# Patient Record
Sex: MALE | Race: WHITE
[De-identification: names, ages, dates, MRNs, and addresses within clinical notes are randomized per-mention and may not be internally consistent; named-entity substitution may affect disease eponyms.]

---

## 2017-03-25 ENCOUNTER — HOSPITAL ENCOUNTER (EMERGENCY)
Dept: HOSPITAL 11 - JP.ED | Age: 67
Discharge: NURSING, REHAB OR LONG-TERM CARE | End: 2017-03-25
Payer: MEDICARE

## 2017-03-25 VITALS — SYSTOLIC BLOOD PRESSURE: 167 MMHG | DIASTOLIC BLOOD PRESSURE: 90 MMHG

## 2017-03-25 DIAGNOSIS — F10.10: ICD-10-CM

## 2017-03-25 DIAGNOSIS — I10: ICD-10-CM

## 2017-03-25 DIAGNOSIS — R73.9: ICD-10-CM

## 2017-03-25 DIAGNOSIS — Z88.8: ICD-10-CM

## 2017-03-25 DIAGNOSIS — R06.02: ICD-10-CM

## 2017-03-25 DIAGNOSIS — R41.0: Primary | ICD-10-CM

## 2017-03-25 DIAGNOSIS — Z79.82: ICD-10-CM

## 2017-03-25 DIAGNOSIS — R74.8: ICD-10-CM

## 2017-03-25 DIAGNOSIS — E87.6: ICD-10-CM

## 2017-03-25 DIAGNOSIS — Z79.899: ICD-10-CM

## 2017-03-25 PROCEDURE — 70450 CT HEAD/BRAIN W/O DYE: CPT

## 2017-03-25 PROCEDURE — 96361 HYDRATE IV INFUSION ADD-ON: CPT

## 2017-03-25 PROCEDURE — 99285 EMERGENCY DEPT VISIT HI MDM: CPT

## 2017-03-25 PROCEDURE — 82550 ASSAY OF CK (CPK): CPT

## 2017-03-25 PROCEDURE — 80053 COMPREHEN METABOLIC PANEL: CPT

## 2017-03-25 PROCEDURE — 82140 ASSAY OF AMMONIA: CPT

## 2017-03-25 PROCEDURE — 96372 THER/PROPH/DIAG INJ SC/IM: CPT

## 2017-03-25 PROCEDURE — 83690 ASSAY OF LIPASE: CPT

## 2017-03-25 PROCEDURE — 84484 ASSAY OF TROPONIN QUANT: CPT

## 2017-03-25 PROCEDURE — 81001 URINALYSIS AUTO W/SCOPE: CPT

## 2017-03-25 PROCEDURE — 71010: CPT

## 2017-03-25 PROCEDURE — 93010 ELECTROCARDIOGRAM REPORT: CPT

## 2017-03-25 PROCEDURE — 93005 ELECTROCARDIOGRAM TRACING: CPT

## 2017-03-25 PROCEDURE — 83880 ASSAY OF NATRIURETIC PEPTIDE: CPT

## 2017-03-25 PROCEDURE — 36415 COLL VENOUS BLD VENIPUNCTURE: CPT

## 2017-03-25 PROCEDURE — 96375 TX/PRO/DX INJ NEW DRUG ADDON: CPT

## 2017-03-25 PROCEDURE — 85025 COMPLETE CBC W/AUTO DIFF WBC: CPT

## 2017-03-25 PROCEDURE — G0480 DRUG TEST DEF 1-7 CLASSES: HCPCS

## 2017-03-25 PROCEDURE — 96374 THER/PROPH/DIAG INJ IV PUSH: CPT

## 2017-03-25 NOTE — EDM.PDOC
ED HPI NEURO





- General


Chief Complaint: Neuro Symptoms/Deficits


Stated Complaint: MEDICAL VIA [facility]


Time Seen by Provider: 03/25/17 18:46


Source: Reports: Patient, Family


History Limitations: Reports: No limitations





- History of Present Illness


INITIAL COMMENTS - FREE TEXT/NARRATIVE: 





pt arrived after a period of being quite confused. He has been ill for the past 

2 days and has neen doing alot of vomiting. He does not have chest pain 


Timing/Duration: Reports: Day(s):, Getting worse, Other (pt has been vomiting 

for several days. He normally drinks etoh on a regular basis . He hs not drank 

for the past 2 days. He may be having some withdrawal at this point. )


Location (Neuro Complaint): Reports: generalized


Quality (Neuro Complaint): Reports: other (Pt is has generalized weakness and 

confusion. )


Associated symptoms: Reports: confusion, weakness, nausea/vomiting





- Related Data


Allergies/ADRs: 


 Allergies











Allergy/AdvReac Type Severity Reaction Status Date / Time


 


amlodipine Allergy  Swelling Verified 03/25/17 19:00


 


nifedipine Allergy  Rash Verified 03/25/17 19:00











Home Meds: 


 Home Meds





Losartan/Hydrochlorothiazide [Hyzaar 100-12.5 Tablet] 2 each PO DAILY 10/04/13 [

History]


Metoprolol Tartrate [Lopressor] 100 mg PO BID 10/04/13 [History]


PARoxetine HCl [Paxil] 40 mg PO DAILY 10/04/13 [History]


Simvastatin [Zocor] 40 mg PO BEDTIME 10/04/13 [History]


glyBURIDE [Diabeta] 2.5 mg PO DAILY 10/04/13 [History]


hydrALAZINE HCl [Hydralazine HCl] 25 mg PO BID 10/04/13 [History]


metFORMIN [Glucophage] 1,000 mg PO BIDM 10/04/13 [History]


Aspirin [Halfprin] 81 mg PO DAILY 03/25/17 [History]











Social & Family History





- Tobacco Use


Years of Tobacco use: 0





- Alcohol Use


Days Per Week of Alcohol Use: 1


Number of Drinks Per Day: 5


Total Drinks Per Week: 5





- Recreational Drug Use


Recreational Drug Use: No





- Living Situation & Occupation


Living situation: Reports: with spouse


Occupation: retired (Substitute teaching)





ED ROS GENERAL





- Review of Systems


Review Of Systems: See Below


Constitutional: Reports: no symptoms


HEENT: Reports: No symptoms


Respiratory: Reports: No Symptoms


Cardiovascular: Reports: Other (pt did have a low o2 sat when the first 

responders arrived. He has been maintaining at 92-93 %.  He has not had any 

chest pain but he has had alot of vomiting and nausea. He has not been able to 

take his meds. )


Endocrine: Reports: high glucose


GI/Abdominal: Reports: Nausea, Vomiting


: Reports: no symptoms


Musculoskeletal: Reports: no symptoms


Skin: Reports: no symptoms





ED EXAM, NEURO





- Physical Exam


Exam: See Below


Text/Narrative:: 





pt is a pale appearing pt who became very confused this afternoon.  He did not 

have chest pain. He has not drank for 2 days and does drink regularly. He 

denies abdomanal pain. 


Exam Limited By: Altered mental status


General Appearance: alert, obtunded, other (pupils are equal and reactive. )


Ears: normal TMs


Nose: normal inspection


Throat/Mouth: Normal inspection


Head Exam: atraumatic


Neck: normal inspection


Respiratory/Chest: no respiratory distress


Cardiovascular: regular rate, rhythm


GI/Abdominal: soft, non tender


 (Male) Exam: Deferred


Rectal (Males) Exam: Deferred


Neurological: alert, other (pt has generalized weakness. He is slow to answer. 

He is oriented to name and where he is. )


Back Exam: normal inspection


Extremities: normal inspection


Psychiatric: flat affect, other ( slow to respond. )





Course





- Vital Signs


Last Recorded V/S: 


 Last Vital Signs











Temp  37.8 C   03/25/17 20:20


 


Pulse  107 H  03/25/17 20:00


 


Resp  18   03/25/17 20:00


 


BP  167/90 H  03/25/17 20:00


 


Pulse Ox  94 L  03/25/17 20:00














- Orders/Labs/Meds


Orders: 


 Active Orders 24 hr











 Category Date Time Status


 


 EKG Documentation Completion [RC] ASDIRECTED Care  03/25/17 18:46 Active


 


 Chest 1V Frontal [CR] Stat Exams  03/25/17 19:41 Ordered


 


 Head wo Cont [CT] Stat Exams  03/25/17 18:45 Taken


 


 Potassium Chloride [KCL 20 MEQ in Water 100 ML] 20 meq Med  03/25/17 19:52 

Ordered





 Premix Bag 1 bag   





 IV ONETIME   


 


 Sodium Chloride 0.9% [Normal Saline] 1,000 ml Med  03/25/17 18:45 Active





 IV ASDIRECTED   


 


 EKG 12 Lead [EK] Routine Ther  03/25/17 18:46 Ordered








 Medication Orders





Sodium Chloride (Normal Saline)  1,000 mls @ 200 mls/hr IV ASDIRECTED DIMITRI


   Last Admin: 03/25/17 18:50  Dose: 200 mls/hr


Potassium Chloride 20 meq/ (Premix)  100 mls @ 50 mls/hr IV ONETIME ONE


   Stop: 03/25/17 21:51


   Last Admin: 03/25/17 20:05 Dose:  50 mls/hr








Labs: 


 Laboratory Tests











  03/25/17 03/25/17 03/25/17 Range/Units





  18:55 18:55 18:55 


 


WBC     (4.5-11.0)  K/uL


 


RBC     (4.30-5.90)  M/uL


 


Hgb     (12.0-15.0)  g/dL


 


Hct     (40.0-54.0)  %


 


MCV     (80-98)  fL


 


MCH     (27-31)  pg


 


MCHC     (32-36)  %


 


Plt Count     (150-400)  K/uL


 


Neut % (Auto)     (36-66)  %


 


Lymph % (Auto)     (24-44)  %


 


Mono % (Auto)     (2-6)  %


 


Eos % (Auto)     (2-4)  %


 


Baso % (Auto)     (0-1)  %


 


Sodium     (140-148)  mmol/L


 


Potassium     (3.6-5.2)  mmol/L


 


Chloride     (100-108)  mmol/L


 


Carbon Dioxide     (21-32)  mmol/L


 


Anion Gap     (5.0-14.0)  mmol/L


 


BUN     (7-18)  mg/dL


 


Creatinine     (0.8-1.3)  mg/dL


 


Est Cr Clr Drug Dosing     mL/min


 


Estimated GFR (MDRD)     (>60)  


 


Glucose     ()  mg/dL


 


Calcium     (8.5-10.1)  mg/dL


 


Total Bilirubin     (0.2-1.0)  mg/dL


 


AST     (15-37)  U/L


 


ALT     (12-78)  U/L


 


Alkaline Phosphatase     ()  U/L


 


Ammonia  12    (11-32)  mmol/L


 


Creatine Kinase     ()  U/L


 


Troponin I    0.441 H*  (0.000-0.056)  ng/mL


 


Pro-B-Natriuretic Pept     (5-125)  pg/mL


 


Total Protein     (6.4-8.2)  g/dL


 


Albumin     (3.4-5.0)  g/dL


 


Globulin     (2.3-3.5)  g/dL


 


Albumin/Globulin Ratio     (1.2-2.2)  


 


Lipase   245   ()  U/L


 


Urine Color     


 


Urine Appearance     


 


Urine pH     (4.5-8.0)  


 


Ur Specific Gravity     (1.008-1.030)  


 


Urine Protein     (NEGATIVE)  mg/dL


 


Urine Glucose (UA)     (NEGATIVE)  mg/dL


 


Urine Ketones     (NEGATIVE)  mg/dL


 


Urine Occult Blood     (NEGATIVE)  


 


Urine Nitrite     (NEGAITVE)  


 


Urine Bilirubin     (NEGATIVE)  


 


Urine Urobilinogen     (NORMAL)  mg/dL


 


Ur Leukocyte Esterase     (NEGATIVE)  


 


Urine RBC     (0-5)  


 


Urine WBC     (0-5)  


 


Ur Epithelial Cells     


 


Amorphous Sediment     


 


Urine Bacteria     


 


Urine Mucus     


 


Ethyl Alcohol     mg/dL














  03/25/17 03/25/17 03/25/17 Range/Units





  18:55 18:55 19:20 


 


WBC  12.4 H    (4.5-11.0)  K/uL


 


RBC  4.04 L    (4.30-5.90)  M/uL


 


Hgb  13.8    (12.0-15.0)  g/dL


 


Hct  39.5 L    (40.0-54.0)  %


 


MCV  98    (80-98)  fL


 


MCH  34 H    (27-31)  pg


 


MCHC  35    (32-36)  %


 


Plt Count  155    (150-400)  K/uL


 


Neut % (Auto)  89 H    (36-66)  %


 


Lymph % (Auto)  4 L    (24-44)  %


 


Mono % (Auto)  7 H    (2-6)  %


 


Eos % (Auto)  0 L    (2-4)  %


 


Baso % (Auto)  0    (0-1)  %


 


Sodium   137 L   (140-148)  mmol/L


 


Potassium   3.1 L   (3.6-5.2)  mmol/L


 


Chloride   94 L   (100-108)  mmol/L


 


Carbon Dioxide   26   (21-32)  mmol/L


 


Anion Gap   20.1 H   (5.0-14.0)  mmol/L


 


BUN   12   (7-18)  mg/dL


 


Creatinine   1.2   (0.8-1.3)  mg/dL


 


Est Cr Clr Drug Dosing   62.75   mL/min


 


Estimated GFR (MDRD)   > 60   (>60)  


 


Glucose   322 H   ()  mg/dL


 


Calcium   9.1   (8.5-10.1)  mg/dL


 


Total Bilirubin   0.9   (0.2-1.0)  mg/dL


 


AST   52 H   (15-37)  U/L


 


ALT   39   (12-78)  U/L


 


Alkaline Phosphatase   59   ()  U/L


 


Ammonia     (11-32)  mmol/L


 


Creatine Kinase   426 H   ()  U/L


 


Troponin I     (0.000-0.056)  ng/mL


 


Pro-B-Natriuretic Pept     (5-125)  pg/mL


 


Total Protein   7.8   (6.4-8.2)  g/dL


 


Albumin   4.2   (3.4-5.0)  g/dL


 


Globulin   3.6 H   (2.3-3.5)  g/dL


 


Albumin/Globulin Ratio   1.2   (1.2-2.2)  


 


Lipase     ()  U/L


 


Urine Color    Yellow  


 


Urine Appearance    Slightly cloudy  


 


Urine pH    7.0  (4.5-8.0)  


 


Ur Specific Gravity    1.015  (1.008-1.030)  


 


Urine Protein    Negative  (NEGATIVE)  mg/dL


 


Urine Glucose (UA)    1000 H  (NEGATIVE)  mg/dL


 


Urine Ketones    50 H  (NEGATIVE)  mg/dL


 


Urine Occult Blood    Large  (NEGATIVE)  


 


Urine Nitrite    Negative  (NEGAITVE)  


 


Urine Bilirubin    Negative  (NEGATIVE)  


 


Urine Urobilinogen    Normal  (NORMAL)  mg/dL


 


Ur Leukocyte Esterase    Negative  (NEGATIVE)  


 


Urine RBC    5-10 H  (0-5)  


 


Urine WBC    0-5  (0-5)  


 


Ur Epithelial Cells    Few  


 


Amorphous Sediment    Not seen  


 


Urine Bacteria    Many  


 


Urine Mucus    Rare  


 


Ethyl Alcohol     mg/dL














  03/25/17 03/25/17 Range/Units





  19:45 19:45 


 


WBC    (4.5-11.0)  K/uL


 


RBC    (4.30-5.90)  M/uL


 


Hgb    (12.0-15.0)  g/dL


 


Hct    (40.0-54.0)  %


 


MCV    (80-98)  fL


 


MCH    (27-31)  pg


 


MCHC    (32-36)  %


 


Plt Count    (150-400)  K/uL


 


Neut % (Auto)    (36-66)  %


 


Lymph % (Auto)    (24-44)  %


 


Mono % (Auto)    (2-6)  %


 


Eos % (Auto)    (2-4)  %


 


Baso % (Auto)    (0-1)  %


 


Sodium    (140-148)  mmol/L


 


Potassium    (3.6-5.2)  mmol/L


 


Chloride    (100-108)  mmol/L


 


Carbon Dioxide    (21-32)  mmol/L


 


Anion Gap    (5.0-14.0)  mmol/L


 


BUN    (7-18)  mg/dL


 


Creatinine    (0.8-1.3)  mg/dL


 


Est Cr Clr Drug Dosing    mL/min


 


Estimated GFR (MDRD)    (>60)  


 


Glucose    ()  mg/dL


 


Calcium    (8.5-10.1)  mg/dL


 


Total Bilirubin    (0.2-1.0)  mg/dL


 


AST    (15-37)  U/L


 


ALT    (12-78)  U/L


 


Alkaline Phosphatase    ()  U/L


 


Ammonia    (11-32)  mmol/L


 


Creatine Kinase    ()  U/L


 


Troponin I    (0.000-0.056)  ng/mL


 


Pro-B-Natriuretic Pept  2580 H   (5-125)  pg/mL


 


Total Protein    (6.4-8.2)  g/dL


 


Albumin    (3.4-5.0)  g/dL


 


Globulin    (2.3-3.5)  g/dL


 


Albumin/Globulin Ratio    (1.2-2.2)  


 


Lipase    ()  U/L


 


Urine Color    


 


Urine Appearance    


 


Urine pH    (4.5-8.0)  


 


Ur Specific Gravity    (1.008-1.030)  


 


Urine Protein    (NEGATIVE)  mg/dL


 


Urine Glucose (UA)    (NEGATIVE)  mg/dL


 


Urine Ketones    (NEGATIVE)  mg/dL


 


Urine Occult Blood    (NEGATIVE)  


 


Urine Nitrite    (NEGAITVE)  


 


Urine Bilirubin    (NEGATIVE)  


 


Urine Urobilinogen    (NORMAL)  mg/dL


 


Ur Leukocyte Esterase    (NEGATIVE)  


 


Urine RBC    (0-5)  


 


Urine WBC    (0-5)  


 


Ur Epithelial Cells    


 


Amorphous Sediment    


 


Urine Bacteria    


 


Urine Mucus    


 


Ethyl Alcohol   < 3  mg/dL











Meds: 


Medications











Generic Name Dose Route Start Last Admin





  Trade Name Freq  PRN Reason Stop Dose Admin


 


Sodium Chloride  1,000 mls @ 200 mls/hr  03/25/17 18:45  03/25/17 18:50





  Normal Saline  IV   200 mls/hr





  ASDIRECTED DIMITRI   Administration


 


Potassium Chloride 20 meq/  100 mls @ 50 mls/hr  03/25/17 19:52  03/25/17 20:05





  Premix  IV  03/25/17 21:51  50 mls/hr





  ONETIME ONE   Administration














Discontinued Medications














Generic Name Dose Route Start Last Admin





  Trade Name Pilar  PRN Reason Stop Dose Admin


 


Insulin Aspart  7 unit  03/25/17 19:54  03/25/17 20:15





  Novolog  SUBCUT  03/25/17 19:55  7 units





  ASDIRECTED ONE   Administration


 


Labetalol HCl  20 mg  03/25/17 19:53  03/25/17 19:59





  Normodyne  IVPUSH  03/25/17 19:54  20 mg





  NOW ONE   Administration


 


Lidocaine HCl  Confirm  03/25/17 19:58  03/25/17 20:15





  Xylocaine-Mpf 1%  Administered  03/25/17 19:59  Not Given





  Dose   





  5 ml   





  .ROUTE   





  .STK-MED ONE   


 


Lidocaine HCl  2 ml  03/25/17 20:08  03/25/17 20:10





  Xylocaine-Mpf 1%  INJECT  03/25/17 20:09  2 ml





  ONETIME ONE   Administration


 


Ondansetron HCl  4 mg  03/25/17 18:44  03/25/17 18:51





  Zofran  IVPUSH  03/25/17 18:45  4 mg





  ONETIME ONE   Administration














- Re-Assessments/Exams


Free Text/Narrative Re-Assessment/Exam: 





03/25/17 20:19


pt had a elevated trop at .44. He has a bs of 344. He has a k of 3.1. He has an 

elevated bp and was given labatiol 20mg iv. He ws given kcl. He was given7 

units of humolog . He was given kcl. He had asa in the ambulance.  


03/25/17 20:23


 cat scan of the head was normal. 





Departure





- Departure


Time of Disposition: 20:21


Disposition: DC/[facility] to [facility]


Condition: fair


Clinical Impression: 


 Elevated troponin, Confusion, ETOH abuse, Hypokalemia, Hypertension, 

Hyperglycemia





Referrals: 


PCP,None [Primary Care Provider] - 


Forms:  ED Department Discharge


Care Plan Goals: 


 transfer to [facility] by ambulance. 





- My Orders


Last 24 Hours: 


My Active Orders





03/25/17 18:45


Head wo Cont [CT] Stat 


Sodium Chloride 0.9% [Normal Saline] 1,000 ml IV ASDIRECTED 





03/25/17 18:46


EKG Documentation Completion [RC] ASDIRECTED 


EKG 12 Lead [EK] Routine 





03/25/17 19:41


Chest 1V Frontal [CR] Stat 





03/25/17 19:52


Potassium Chloride [KCL 20 MEQ in Water 100 ML] 20 meq   Premix Bag 1 bag IV 

ONETIME 














- Assessment/Plan


Last 24 Hours: 


My Active Orders





03/25/17 18:45


Head wo Cont [CT] Stat 


Sodium Chloride 0.9% [Normal Saline] 1,000 ml IV ASDIRECTED 





03/25/17 18:46


EKG Documentation Completion [RC] ASDIRECTED 


EKG 12 Lead [EK] Routine 





03/25/17 19:41


Chest 1V Frontal [CR] Stat 





03/25/17 19:52


Potassium Chloride [KCL 20 MEQ in Water 100 ML] 20 meq   Premix Bag 1 bag IV 

ONETIME

## 2017-03-27 NOTE — CR
Chest 1V Frontal

 

HISTORY: Shortness of breath.

 

Comparison: 10/25/2015.

 

FINDINGS: Cardiac size and pulmonary vessels are normal. The lungs are clear.

 

IMPRESSION: Negative AP chest.

## 2018-01-08 ENCOUNTER — HOSPITAL ENCOUNTER (EMERGENCY)
Dept: HOSPITAL 11 - JP.ED | Age: 68
LOS: 1 days | Discharge: NURSING, REHAB OR LONG-TERM CARE | End: 2018-01-09
Payer: MEDICARE

## 2018-01-08 VITALS — SYSTOLIC BLOOD PRESSURE: 164 MMHG | DIASTOLIC BLOOD PRESSURE: 92 MMHG

## 2018-01-08 DIAGNOSIS — Z79.84: ICD-10-CM

## 2018-01-08 DIAGNOSIS — F10.231: Primary | ICD-10-CM
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
## 2018-01-08 DIAGNOSIS — Z79.899: ICD-10-CM

## 2018-01-08 DIAGNOSIS — E78.00: ICD-10-CM

## 2018-01-08 DIAGNOSIS — E11.9: ICD-10-CM

## 2018-01-08 DIAGNOSIS — Z87.891: ICD-10-CM

## 2018-01-08 DIAGNOSIS — F32.9: ICD-10-CM

## 2018-01-08 DIAGNOSIS — S01.512A: ICD-10-CM
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
## 2018-01-08 DIAGNOSIS — I10: ICD-10-CM

## 2018-01-08 DIAGNOSIS — Z88.8: ICD-10-CM

## 2018-01-08 PROCEDURE — 99285 EMERGENCY DEPT VISIT HI MDM: CPT

## 2018-01-08 PROCEDURE — 81001 URINALYSIS AUTO W/SCOPE: CPT

## 2018-01-08 PROCEDURE — 85025 COMPLETE CBC W/AUTO DIFF WBC: CPT

## 2018-01-08 PROCEDURE — 80053 COMPREHEN METABOLIC PANEL: CPT

## 2018-01-08 PROCEDURE — 96376 TX/PRO/DX INJ SAME DRUG ADON: CPT

## 2018-01-08 PROCEDURE — 80305 DRUG TEST PRSMV DIR OPT OBS: CPT

## 2018-01-08 PROCEDURE — 96365 THER/PROPH/DIAG IV INF INIT: CPT

## 2018-01-08 PROCEDURE — 96366 THER/PROPH/DIAG IV INF ADDON: CPT

## 2018-01-08 PROCEDURE — G0480 DRUG TEST DEF 1-7 CLASSES: HCPCS

## 2018-01-08 PROCEDURE — 85610 PROTHROMBIN TIME: CPT

## 2018-01-08 PROCEDURE — 96375 TX/PRO/DX INJ NEW DRUG ADDON: CPT

## 2018-01-08 PROCEDURE — 70450 CT HEAD/BRAIN W/O DYE: CPT

## 2018-01-08 PROCEDURE — 36415 COLL VENOUS BLD VENIPUNCTURE: CPT

## 2018-01-08 NOTE — EDM.PDOC
ED HPI GENERAL MEDICAL PROBLEM





- General


Chief Complaint: Neuro Symptoms/Deficits


Stated Complaint: SEIZURE


Time Seen by Provider: 01/08/18 18:07


Source of Information: Reports: Patient, Family, RN Notes Reviewed


History Limitations: Reports: No Limitations





- History of Present Illness


INITIAL COMMENTS - FREE TEXT/NARRATIVE: 





67-year-old gentleman presents to the emergency department today with the 

complaint of seizure-like activity, he has a known history of alcohol abuse and 

dependence has had one seizure episode prior which included workup of CT scan. 

For this particular event he does admit to consuming vodka and wine last drink 

was on Friday or approximately 72 hours prior he did have tremors yesterday has 

had difficulty with vomiting throughout the weekend had an episode today 

described by his wife where he was stiff did bite his tongue eyes were closed 

and was having difficulty maintaining his secretions. At this time he denies 

any symptomology other than a sore tongue and is in denial about his alcohol 

use.





- Related Data


 Allergies











Allergy/AdvReac Type Severity Reaction Status Date / Time


 


amlodipine Allergy  Swelling Verified 01/08/18 17:56


 


nifedipine Allergy  Rash Verified 01/08/18 17:56











Home Meds: 


 Home Meds





Losartan/Hydrochlorothiazide [Hyzaar 100-12.5 Tablet] 2 each PO DAILY 10/04/13 [

History]


Metoprolol Tartrate [Lopressor] 100 mg PO BID 10/04/13 [History]


PARoxetine HCl [Paxil] 40 mg PO DAILY 10/04/13 [History]


glyBURIDE [Diabeta] 2.5 mg PO DAILY 10/04/13 [History]


hydrALAZINE HCl [Hydralazine HCl] 25 mg PO BID 10/04/13 [History]


metFORMIN [Glucophage] 1,000 mg PO BID 10/04/13 [History]











Past Medical History


HEENT History: Reports: Impaired Vision


Cardiovascular History: Reports: High Cholesterol, Hypertension


Genitourinary History: Reports: Prostate Disorder


Musculoskeletal History: Reports: Back Pain, Chronic


Psychiatric History: Reports: Addiction (Alcohol), Anxiety, Depression


Endocrine/Metabolic History: Reports: Diabetes, Type II





- Infectious Disease History


Infectious Disease History: Reports: Chicken Pox





- Past Surgical History


Neurological Surgical History: Reports: Other (See Below)





Social & Family History





- Tobacco Use


Smoking Status *Q: Unknown Ever Smoked


Years of Tobacco use: 0


Used Tobacco, but Quit: Yes


Month Tobacco Last Used: july 2016





- Caffeine Use


Caffeine Use: Reports: None





- Alcohol Use


Days Per Week of Alcohol Use: 7


Number of Drinks Per Day: 5


Total Drinks Per Week: 35





- Recreational Drug Use


Recreational Drug Use: No





- Living Situation & Occupation


Living situation: Reports: with Spouse


Occupation: Retired





ED ROS GENERAL





- Review of Systems


Review Of Systems: See Below


Constitutional: Reports: No Symptoms


HEENT: Reports: Other (Sore tongue)


Respiratory: Reports: No Symptoms


Cardiovascular: Reports: No Symptoms


Endocrine: Reports: No Symptoms


GI/Abdominal: Reports: No Symptoms


: Reports: No Symptoms


Musculoskeletal: Reports: No Symptoms


Skin: Reports: No Symptoms


Neurological: Reports: No Symptoms





ED EXAM, NEURO





- Physical Exam


Exam: See Below


Text/Narrative:: 





General: Male, not in any distress, alert and oriented x2 confused on year 1971 

HEENT: head is atraumatic normocephalic, eyes pupils equal round reactive to 

light, sclera clear no conjunctivitis appreciated.  Ears tympanic membranes 

clear and gray landmarks and light reflex are present bilaterally canals are 

clear.  Nose no septal deviation, nares are clear, no blood present.  Mouth 

mucosa is dry and pink no erythema or exudate noted in soft palate, tongue is 

midline uvula is midline, tongue does have a superficial lacerations consistent 

with a bite on the right lateral aspect, dentition is intact.


Neck: Supple no thyromegaly no tracheal deviation.


Nodes: Cervical nodes subclavicular nodes nontender no palpable lymphadenopathy 

noted.


Lungs: breath sounds are distant , no adventitious noise appreciated.


CV: Regular rate and rhythm S1 and S2 appreciated no murmurs rubs or gallops 

noted.


Abdomen: Soft, nontender, no palpable masses or organomegaly appreciated, no 

distention no guarding bowel sounds are present, .


Neuro: Cranial nerves II through XII grossly intact


Skin: Warm and dry, intact


Extremities: No lower extremity edema appreciated, pedal pulse is +2.





Course





- Vital Signs


Last Recorded V/S: 


 Last Vital Signs











Temp  99.7 F   01/08/18 21:17


 


Pulse  88   01/08/18 23:40


 


Resp  16   01/08/18 23:40


 


BP  164/92 H  01/08/18 23:40


 


Pulse Ox  92 L  01/08/18 23:40














- Orders/Labs/Meds


Orders: 


 Active Orders 24 hr











 Category Date Time Status


 


 Head wo Cont [CT] Stat Exams  01/08/18 22:27 Taken


 


 DRUG SCREEN, URINE [URCHEM] Stat Lab  01/08/18 22:27 Uncollected


 


 UA W/MICROSCOPIC [URIN] Urgent Lab  01/08/18 22:27 Uncollected


 


 LORazepam [Ativan] Med  01/09/18 00:03 Once





 2 mg IVPUSH ONETIME ONE   











Labs: 


 Laboratory Tests











  01/08/18 01/08/18 01/08/18 Range/Units





  18:32 18:32 18:32 


 


WBC  8.5    (4.5-11.0)  K/uL


 


RBC  4.33    (4.30-5.90)  M/uL


 


Hgb  14.8    (12.0-15.0)  g/dL


 


Hct  41.4    (40.0-54.0)  %


 


MCV  96    (80-98)  fL


 


MCH  34 H    (27-31)  pg


 


MCHC  36    (32-36)  %


 


Plt Count  127 L    (150-400)  K/uL


 


Neut % (Auto)  84 H    (36-66)  %


 


Lymph % (Auto)  6 L    (24-44)  %


 


Mono % (Auto)  11 H    (2-6)  %


 


Eos % (Auto)  0 L    (2-4)  %


 


Baso % (Auto)  0    (0-1)  %


 


PT     (9.5-12.0)  sec


 


INR     (0.80-1.20)  


 


Sodium   134 L   (140-148)  mmol/L


 


Potassium   3.0 L   (3.6-5.2)  mmol/L


 


Chloride   90 L   (100-108)  mmol/L


 


Carbon Dioxide   30   (21-32)  mmol/L


 


Anion Gap   17.0 H   (5.0-14.0)  mmol/L


 


BUN   23 H D   (7-18)  mg/dL


 


Creatinine   1.2   (0.8-1.3)  mg/dL


 


Est Cr Clr Drug Dosing   61.88   mL/min


 


Estimated GFR (MDRD)   > 60   (>60)  


 


Glucose   221 H   ()  mg/dL


 


Calcium   9.2   (8.5-10.1)  mg/dL


 


Total Bilirubin   1.4 H D   (0.2-1.0)  mg/dL


 


AST   72 H   (15-37)  U/L


 


ALT   72  D   (12-78)  U/L


 


Alkaline Phosphatase   62   ()  U/L


 


Total Protein   7.3   (6.4-8.2)  g/dL


 


Albumin   4.2   (3.4-5.0)  g/dL


 


Globulin   3.1   (2.3-3.5)  g/dL


 


Albumin/Globulin Ratio   1.4   (1.2-2.2)  


 


Ethyl Alcohol    < 3  mg/dL














  01/08/18 Range/Units





  22:27 


 


WBC   (4.5-11.0)  K/uL


 


RBC   (4.30-5.90)  M/uL


 


Hgb   (12.0-15.0)  g/dL


 


Hct   (40.0-54.0)  %


 


MCV   (80-98)  fL


 


MCH   (27-31)  pg


 


MCHC   (32-36)  %


 


Plt Count   (150-400)  K/uL


 


Neut % (Auto)   (36-66)  %


 


Lymph % (Auto)   (24-44)  %


 


Mono % (Auto)   (2-6)  %


 


Eos % (Auto)   (2-4)  %


 


Baso % (Auto)   (0-1)  %


 


PT  13.3 H  (9.5-12.0)  sec


 


INR  1.23 H  (0.80-1.20)  


 


Sodium   (140-148)  mmol/L


 


Potassium   (3.6-5.2)  mmol/L


 


Chloride   (100-108)  mmol/L


 


Carbon Dioxide   (21-32)  mmol/L


 


Anion Gap   (5.0-14.0)  mmol/L


 


BUN   (7-18)  mg/dL


 


Creatinine   (0.8-1.3)  mg/dL


 


Est Cr Clr Drug Dosing   mL/min


 


Estimated GFR (MDRD)   (>60)  


 


Glucose   ()  mg/dL


 


Calcium   (8.5-10.1)  mg/dL


 


Total Bilirubin   (0.2-1.0)  mg/dL


 


AST   (15-37)  U/L


 


ALT   (12-78)  U/L


 


Alkaline Phosphatase   ()  U/L


 


Total Protein   (6.4-8.2)  g/dL


 


Albumin   (3.4-5.0)  g/dL


 


Globulin   (2.3-3.5)  g/dL


 


Albumin/Globulin Ratio   (1.2-2.2)  


 


Ethyl Alcohol   mg/dL











Meds: 


Medications














Discontinued Medications














Generic Name Dose Route Start Last Admin





  Trade Name Freq  PRN Reason Stop Dose Admin


 


Multivitamins/Minerals 10 ml/  1,016.2 mls @ 500 mls/hr  01/08/18 18:22  01/08/ 18 18:51





Thiamine HCl 200 mg/ Folic  IV  01/08/18 20:23  500 mls/hr





Acid 1 mg/ Magnesium Sulfate 2  ONETIME ONE   Administration





gm/ Dextrose/Lactated Ringer'     





s     


 


Potassium Chloride 20 meq/  100 mls @ 50 mls/hr  01/08/18 19:03  01/08/18 19:13





  Premix  IV  01/08/18 21:02  50 mls/hr





  ONETIME ONE   Administration


 


Lidocaine HCl  2 ml  01/08/18 19:07  01/08/18 19:14





  Xylocaine-Mpf 1%  INJECT  01/08/18 19:08  2 ml





  ONETIME ONE   Administration


 


Lorazepam  1 mg  01/08/18 21:23  01/08/18 21:32





  Ativan  IVPUSH  01/08/18 21:24  1 mg





  ONETIME ONE   Administration


 


Lorazepam  1 mg  01/08/18 23:14  01/08/18 23:21





  Ativan  IVPUSH  01/08/18 23:15  1 mg





  ONETIME ONE   Administration


 


Potassium Chloride  40 meq  01/08/18 19:03  01/08/18 19:14





  Klor-Con M20  PO  01/08/18 19:04  40 meq





  ONETIME ONE   Administration














- Re-Assessments/Exams


Free Text/Narrative Re-Assessment/Exam: 


Had a discussion with the family they elected to try [facility] 

fac[facility] unfortunately when we tried to ambulate this gentleman he had 

difficulty ambulating became more confused during his time in the emergency 

department., 


01/08/18 22:28








Departure





- Departure


Time of Disposition: 00:06


Disposition: DC/Tfer to Acute Hospital [facility]


Condition: Fair


Clinical Impression: 


 Delirium tremens, ETOH abuse





Alcohol withdrawal seizure


Qualifiers:


 Complication of substance-induced condition: with delirium Qualified Code(s): 

F10.231 - Alcohol dependence with withdrawal delirium








- Discharge Information


Referrals: 


Enrique Arrington MD [Primary Care Provider] - 


Forms:  ED Department Discharge





- My Orders


Last 24 Hours: 


My Active Orders





01/08/18 22:27


Head wo Cont [CT] Stat 


DRUG SCREEN, URINE [URCHEM] Stat 


UA W/MICROSCOPIC [URIN] Urgent 





01/09/18 00:03


LORazepam [Ativan]   2 mg IVPUSH ONETIME ONE 














- Assessment/Plan


Last 24 Hours: 


My Active Orders





01/08/18 22:27


Head wo Cont [CT] Stat 


DRUG SCREEN, URINE [URCHEM] Stat 


UA W/MICROSCOPIC [URIN] Urgent 





01/09/18 00:03


LORazepam [Ativan]   2 mg IVPUSH ONETIME ONE 











Plan: 





Assessment





Acuity = acute





Site and laterality = alcohol withdrawal with seizure like activity and concern 

for development of delirium tremors  





Etiology  = alcohol abuse and dependence





Manifestations = none





Location of injury =  Home





Lab values = platelets low at 127 consistent with thrombocytopenia sodium low 

at 134 consistent with hyponatremia potassium low at 3.0 consistent with 

hypokalemia glucose elevated at 221 consistent with hyperglycemia bilirubin 

high at 1.4 consistent with hyperbilirubinemia AST elevated at 72 consistent 

elevated liver enzymes alcohol is not measurable, INR is 1.23 urine never 

collected





Plan


Potassium was replaced with IV and oral medications he was given 1 banana bag 

while in the ED observed for several hours no seizure-like activity , has been 

provided total of 4 mg Ativan while in the emergency department, called and 

discussed the case with hospitalist at [facility] Dr. Hemphill he 

kindly accepted the patient in transport he will be transported via EMS ground.

















 This note was dictated using dragon voice recognition software please call 

with any questions on syntax or eloisa.

## 2021-03-10 NOTE — CRLCT
Indication:



UTI with flank pain and assess for urinary obstruction or abscess.



Technique:



Volumetric multidetector CT images of the abdomen and pelvis were obtained 

before and after the administration of intravenous contrast using a 

urographic protocol.



100 cc Isovue-300 low osmolar intravenous contrast



Comparison:



CT chest, abdomen and pelvis December 22, 2020



Findings:



There is dependent basilar atelectasis and parenchymal scarring.



The liver is normal in attenuation without intrahepatic biliary ductal 

dilatation.



The portal vein is patent.



The gallbladder demonstrates mild gallbladder wall thickening.



There is no significant common biliary ductal dilatation or abrupt cut off.



The spleen is normal in enhancement and size.



The stomach is overall contracted and decompressed. Otherwise the stomach 

and proximal duodenum are grossly unremarkable.



The pancreas is normal in enhancement without significant atrophy.



The adrenal glands are unremarkable.



The kidneys demonstrate mild to moderate perinephric stranding. There is no 

evidence of radiopaque calculus or definite obstructive uropathy. There is 

no evidence of filling defect within the collecting systems. There is mild 

nonspecific bladder wall thickening which can be seen in the setting of 

inflammatory changes. There is moderate prostatic hypertrophy.



There is a mild-to-moderate amount of stool seen throughout the colon with 

minimal distal colonic diverticulosis. There are fluid-filled loops of 

central small bowel which may represent mild enteritis changes.



The appendix is unremarkable.



There are pathologically enlarged retroperitoneal lymph nodes seen at the 

level of the kidneys the largest within the right retroperitoneum measuring 

up to 2.9 centimeters in greatest dimension seen on series 8, image 68. 

There are markedly enlarged right greater than left iliac chain, inguinal 

and pelvic lymph nodes the largest measuring 2.4 centimeters on series 8, 

image 131.



The aorta is non aneurysmal with scattered atherosclerotic calcifications.



The solid pelvic viscera are grossly unremarkable.



There is no free fluid or free air.



The anterior abdominal wall is intact without significant hernias.



The lumbar vertebral body heights are grossly maintained with moderate 

degenerative disc disease. There is flowing anterior osteophytosis. And 

there is demonstration of punctate sclerotic foci seen throughout the axial 

and appendicular skeleton most prominently along the inferior L5 and T10 

levels. Additional punctate sclerotic foci are seen throughout the 

bilateral iliac bones and proximal femora.



Impression:



Demonstration of minimal nonspecific perinephric stranding of the kidneys 

without evidence of obstructive uropathy or filling defect. No evidence of 

rim enhancing fluid collection. Mild nonspecific thickening of the bladder 

which can be associated with infectious or inflammatory changes. 



Demonstration of markedly pathologic appearing right greater than left 

iliac and retroperitoneal lymph nodes with additional scattered sclerotic 

foci seen throughout the partially visualized axial and appendicular 

skeleton concerning for metastatic changes from unknown malignancy. There 

is moderate prostatic hypertrophy. Correlate for serum prostate specific 

antigen level if there is concern for potential prostate malignancy. 

Otherwise, further evaluation of pathologic right iliac chain lymph nodes 

with ultrasound or CT-guided biopsy is recommended. 



No other acute intra-abdominal abnormalities are appreciated.



Please note that all CT scans at this facility use dose modulation, 

iterative reconstruction, and/or weight-based dosing when appropriate to 

reduce radiation dose to as low as reasonably achievable.



Dictated by Jose Miguel Benavides MD @ Mar 10 2021  4:31PM



Signed by Dr. Jose Miguel Benavides @ Mar 10 2021  4:52PM

## 2021-03-10 NOTE — PCM.HP.2
H&P History of Present Illness





- General


Date of Service: 03/10/21


Admit Problem/Dx: 


Complicated urinary tract infection


Source of Information: Patient


History Limitations: Reports: No Limitations





- History of Present Illness


Initial Comments - Free Text/Narative: 





1-year-old male with a past medical history of prostate cancer as well as 

reported lymphoma.  He has a history of hypertension on Hyzaar, Lopressor, 

hydralazine, and Lasix.  Patient also has a history of diabetes mellitus on 

Metformin and glyburide.





Patient was evaluated in the emergency department earlier this morning with 

concerns of dysuria, urinary frequency, fever, and malaise.  Analysis performed 

in the clinic concerning for urinary tract infection.  The patient also had low 

potassium level at 2.2.  The patient reports his symptoms have been present now 

for at least 3 to 4 days.  He typically ambulates without assistive device but 

is quired a cane recently.





In the ER, the patient received a couple liters of IV fluids and a dose of 

Rocephin.  Blood cultures were drawn.  The patient was febrile to 101 F. 

Admission was requested for further evaluation and treatment of complicated 

urinary tract infection.


Symptom Onset Date: 03/05/21


Duration of Symptoms: Reports: Day(s):, Getting Worse


Location: Reports: Back, Generalized


Quality: Reports: Ache


Severity: Mild


Improves with: Reports: Rest


Worsens with: Reports: Movement


Associated Symptoms: Reports: Headaches


  ** Generalized


Pain Score (Numeric/FACES): 6





- Related Data


Allergies/Adverse Reactions: 


                                    Allergies











Allergy/AdvReac Type Severity Reaction Status Date / Time


 


amlodipine Allergy  Swelling Verified 03/10/21 10:56


 


nifedipine Allergy  Rash Verified 03/10/21 10:56











Home Medications: 


                                    Home Meds





Losartan/Hydrochlorothiazide [Hyzaar 100-12.5 Tablet] 2 each PO DAILY 10/04/13 

[History]


Metoprolol Tartrate [Lopressor] 100 mg PO BID 10/04/13 [History]


PARoxetine HCl [Paxil] 40 mg PO DAILY 10/04/13 [History]


glyBURIDE [Diabeta] 2.5 mg PO DAILY 10/04/13 [History]


hydrALAZINE HCl [Hydralazine HCl] 25 mg PO BID 10/04/13 [History]


metFORMIN [Glucophage] 1,000 mg PO BID 10/04/13 [History]


Chlorpheniramine Maleate 12 mg PO DAILY 12/17/20 [History]


Cholecalciferol (Vitamin D3) [Vitamin D] 1,000 unit PO DAILY 12/17/20 [History]


Copper/Ginseng/Saw Palm/Zinc [Prostate Health Formula] 1 each PO DAILY 12/17/20 

[History]


Cyanocobalamin (Vitamin B-12) [B-12] 2,500 mcg SL DAILY 12/17/20 [History]


Folic Acid 0.4 mg PO DAILY 12/17/20 [History]


Furosemide [Lasix] 20 mg PO DAILY 12/17/20 [History]


Multivitamin [Multi-Vitamin Daily] 1 each PO DAILY 12/17/20 [History]


Ondansetron [Ondansetron Odt] 4 mg PO Q8HR PRN 12/17/20 [History]


Abiraterone Acetate 1,000 mg PO DAILY 03/10/21 [History]


Finasteride 5 mg PO DAILY 03/10/21 [History]


Tamsulosin [Tamsulosin 24 Hr] 0.4 mg PO DAILY 03/10/21 [History]











Past Medical History


HEENT History: Reports: Impaired Vision


Cardiovascular History: Reports: High Cholesterol, Hypertension


Respiratory History: Reports: None


Gastrointestinal History: Reports: None


Genitourinary History: Reports: BPH, Prostate Disorder


Musculoskeletal History: Reports: Back Pain, Chronic


Psychiatric History: Reports: Addiction, Anxiety, Depression


Endocrine/Metabolic History: Reports: Diabetes, Type II


Oncologic (Cancer) History: Reports: Prostate





- Infectious Disease History


Infectious Disease History: Reports: Chicken Pox, Novel Coronavirus





- Past Surgical History


HEENT Surgical History: Reports: Tonsillectomy


GI Surgical History: Reports: None


Male  Surgical History: Reports: Prostate Biopsy


Neurological Surgical History: Reports: Other (See Below)


Other Neurological Surgeries/Procedures: Back surgery





Social & Family History





- Tobacco Use


Tobacco Use Status *Q: Former Tobacco User


Used Tobacco, but Quit: Yes


Month/Year Tobacco Last Used: 20 years





- Caffeine Use


Caffeine Use: Reports: None





- Recreational Drug Use


Recreational Drug Use: No





- Living Situation & Occupation


Living situation: Reports: with Spouse


Occupation: Retired





H&P Review of Systems





- Review of Systems:


Review Of Systems: See Below


General: Reports: Fever, Chills, Malaise, Weakness


HEENT: Reports: No Symptoms


Pulmonary: Reports: No Symptoms


Cardiovascular: Reports: No Symptoms


Gastrointestinal: Reports: No Symptoms


Genitourinary: Reports: Dysuria, Frequency, Burning


Musculoskeletal: Reports: Back Pain, Muscle Stiffness


Skin: Reports: No Symptoms


Psychiatric: Reports: No Symptoms


Neurological: Reports: No Symptoms


Hematologic/Lymphatic: Reports: No Symptoms


Immunologic: Reports: No Symptoms





Exam





- Exam


Exam: See Below





- Vital Signs


Vital Signs: 


                                Last Vital Signs











Temp  98.3 F   03/10/21 10:53


 


Pulse  81   03/10/21 13:42


 


Resp  20   03/10/21 13:42


 


BP  125/70   03/10/21 13:42


 


Pulse Ox  95   03/10/21 13:42











Weight: 179 lb





- Exam


General: Alert, Oriented


HEENT: Conjunctiva Clear, Hearing Intact


Neck: Supple, Trachea Midline


Lungs: Clear to Auscultation


Cardiovascular: Regular Rate, Regular Rhythm


GI/Abdominal Exam: Soft, Non-Tender


Back Exam: CVA Tenderness (R), CVA Tenderness (L)


Extremities: Normal Inspection


Skin: Warm, Dry


Neuro Extensive - Mental Status: Alert, Oriented x3


Psychiatric: Normal Affect, Normal Mood





- Patient Data


Lab Results Last 24 hrs: 


                         Laboratory Results - last 24 hr











  03/10/21 03/10/21 Range/Units





  11:32 11:32 


 


POC Glucose   229 H  ()  MG/DL


 


Lactic Acid  1.6   (0.4-2.0)  mmol/L











  ** #1 Interpretation


EKG Date: 03/10/21


Time: 15:00


Rhythm: NSR


Axis: LAD-Left Axis Deviation


P-Wave: Present


QRS: Normal


ST-T: Normal


QT: Prolonged





Sepsis Event Note





- Evaluation


Sepsis Screening Result: No Definite Risk





- Focused Exam


Vital Signs: 


                                   Vital Signs











  Temp Pulse Resp BP Pulse Ox


 


 03/10/21 13:42   81  20  125/70  95


 


 03/10/21 12:45   82  14  129/67  92 L


 


 03/10/21 11:45   86  16  132/65  90 L


 


 03/10/21 10:53  98.3 F  88  15  132/70  95


 


 03/10/21 10:50  98.3 F  88  15  132/70  95











Problem List Initiated/Reviewed/Updated: Yes


Orders Last 24hrs: 


                               Active Orders 24 hr











 Category Date Time Status


 


 CULTURE BLOOD [BC] Urgent Lab  03/10/21 11:22 Received


 


 CULTURE BLOOD [BC] Urgent Lab  03/10/21 11:32 Received


 


 Sodium Chloride 0.9% [Normal Saline] 1,000 ml Med  03/10/21 11:15 Active





 IV ASDIRECTED   


 


 Sodium Chloride 0.9% [Normal Saline] 1,000 ml Med  03/10/21 11:15 Active





 IV ASDIRECTED   


 


 Blood Culture x2 Reflex Set [OM.PC] Urgent Oth  03/10/21 11:15 Ordered


 


 EKG 12 Lead [EK] Routine Ther  03/10/21 11:13 Ordered








                                Medication Orders





Sodium Chloride (Normal Saline)  1,000 mls @ 999 mls/hr IV ASDIRECTED [facility]


   Last Admin: 03/10/21 11:55  Dose: 999 mls/hr


   Documented by: PREILOR


   Infusion: 03/10/21 11:55  Dose: 999 mls/hr


   Documented by: PREILOR


   Admin: 03/10/21 11:54  Dose: 999 mls/hr


   Documented by: PREILOR


Sodium Chloride (Normal Saline)  1,000 mls @ 999 mls/hr IV ASDIRECTED [facility]


   Last Admin: 03/10/21 12:00  Dose: 999 mls/hr


   Documented by: PREILOR








Assessment/Plan Comment:: 


COMPLICATED URINARY TRACT INFECTION


70-year-old male with known prostate cancer now with systemic signs of infection

 including fever and malaise with urinalysis consistent with UTI.  Patient 

received fluid bolus as well as ceftriaxone in the emergency department.  He 

reports bilateral flank pain.


Requested continued daily Rocephin on the hospital [place].


Requested CT with contrast of the abdomen pelvis to rule out focal fluid 

collection or any urinary obstruction.


Follow-up urine culture from clinic and blood cultures from the hospital.


Requested continue to maintenance IV fluids with potassium.





HYPOKALEMIA


Moderate.  Patient received 20 mEq IV potassium in the ER.  He denies 

significant nausea.


Requested continued IV fluids with potassium added.  Reassess potassium this 

evening and again in the morning.


Requested telemetry.





DIABETES MELLITUS


Hold PTA Metformin and glyburide for now


Requested 4 times daily Accu-Cheks with low-dose sliding scale insulin during 

patient's hospitalization





HYPERTENSION


Patient currently normotensive and would like to be cautious with 

antihypertensive medications in the setting of complicated UTI.


Requested a Lopressor at reduced dosing


Hold hydralazine for now.  Add back if blood pressure allows.


Hold Hyzaar for now.  Add back as potassium level and blood pressure allows.


Hold Lasix for now.





PROSTATE CANCER


Patient usually on abiraterone.


Resume abiraterone following hospitalization and follow-up routine with 

urology/oncology.





DVT PROPHYLAXIS


Requested Lovenox





DISPOSITION


Patient admitted as an inpatient.  Projected hospitalization is 2 to 3 days 

with target disposition home.  Consider PT/OT evaluation if patient is noted to 

have significant problems with mobility in the next 24 to 48 hours.

## 2021-03-10 NOTE — EDM.PDOC
ED HPI GENERAL MEDICAL PROBLEM





- General


Chief Complaint: General


Stated Complaint: WEAK, LOW POTASSIUM


Time Seen by Provider: 03/10/21 11:16


Source of Information: Reports: Patient, Police


History Limitations: Reports: No Limitations





- History of Present Illness


INITIAL COMMENTS - FREE TEXT/NARRATIVE: 





pt was seen at the clinic ansd was found to have k of 2.2. He did have a temp of

101. He is on oral chemo for his prostate Ca.  His urine was infected. 


Onset: Gradual


Duration: Day(s):, Other (pt has been much weaker. )


Location: Reports: Generalized


Associated Symptoms: Reports: Fever/Chills, Loss of Appetite, Weakness


  ** Generalized


Pain Score (Numeric/FACES): 6





- Related Data


                                    Allergies











Allergy/AdvReac Type Severity Reaction Status Date / Time


 


amlodipine Allergy  Swelling Verified 03/10/21 10:56


 


nifedipine Allergy  Rash Verified 03/10/21 10:56











Home Meds: 


                                    Home Meds





Losartan/Hydrochlorothiazide [Hyzaar 100-12.5 Tablet] 2 each PO DAILY 10/04/13 

[History]


Metoprolol Tartrate [Lopressor] 100 mg PO BID 10/04/13 [History]


PARoxetine HCl [Paxil] 40 mg PO DAILY 10/04/13 [History]


glyBURIDE [Diabeta] 2.5 mg PO DAILY 10/04/13 [History]


hydrALAZINE HCl [Hydralazine HCl] 25 mg PO BID 10/04/13 [History]


metFORMIN [Glucophage] 1,000 mg PO BID 10/04/13 [History]


Chlorpheniramine Maleate 12 mg PO DAILY 12/17/20 [History]


Cholecalciferol (Vitamin D3) [Vitamin D] 1,000 unit PO DAILY 12/17/20 [History]


Copper/Ginseng/Saw Palm/Zinc [Prostate Health Formula] 1 each PO DAILY 12/17/20 

[History]


Cyanocobalamin (Vitamin B-12) [B-12] 2,500 mcg SL DAILY 12/17/20 [History]


Folic Acid 0.4 mg PO DAILY 12/17/20 [History]


Furosemide [Lasix] 20 mg PO DAILY 12/17/20 [History]


Multivitamin [Multi-Vitamin Daily] 1 each PO DAILY 12/17/20 [History]


Ondansetron [Ondansetron Odt] 4 mg PO Q8HR PRN 12/17/20 [History]


Abiraterone Acetate 1,000 mg PO DAILY 03/10/21 [History]


Finasteride 5 mg PO DAILY 03/10/21 [History]


Tamsulosin [Tamsulosin 24 Hr] 0.4 mg PO DAILY 03/10/21 [History]











Past Medical History


HEENT History: Reports: Impaired Vision


Cardiovascular History: Reports: High Cholesterol, Hypertension


Respiratory History: Reports: None


Gastrointestinal History: Reports: None


Genitourinary History: Reports: BPH, Prostate Disorder


Musculoskeletal History: Reports: Back Pain, Chronic


Psychiatric History: Reports: Addiction, Anxiety, Depression


Endocrine/Metabolic History: Reports: Diabetes, Type II


Oncologic (Cancer) History: Reports: Prostate





- Infectious Disease History


Infectious Disease History: Reports: Chicken Pox, Novel Coronavirus





- Past Surgical History


HEENT Surgical History: Reports: Tonsillectomy


GI Surgical History: Reports: None


Male  Surgical History: Reports: Prostate Biopsy


Neurological Surgical History: Reports: Other (See Below)


Other Neurological Surgeries/Procedures: Back surgery





Social & Family History





- Tobacco Use


Tobacco Use Status *Q: Former Tobacco User


Used Tobacco, but Quit: Yes


Month/Year Tobacco Last Used: 20 years





- Caffeine Use


Caffeine Use: Reports: None





- Recreational Drug Use


Recreational Drug Use: No





- Living Situation & Occupation


Living situation: Reports: with Spouse


Occupation: Retired





ED ROS GENERAL





- Review of Systems


Review Of Systems: See Below


Constitutional: Reports: Fever, Chills, Malaise


HEENT: Reports: No Symptoms


Respiratory: Reports: No Symptoms


Cardiovascular: Reports: Palpitations


Endocrine: Reports: No Symptoms


GI/Abdominal: Reports: No Symptoms


: Reports: No Symptoms


Musculoskeletal: Reports: No Symptoms


Skin: Reports: No Symptoms





ED EXAM, GENERAL





- Physical Exam


Exam: See Below


Free Text/Narrative:: 





pt had labs at the clinic. He had a k of 2.2. He has a normal wbc. He has a uti 

and a fever. He is on oral chemo. 


Exam Limited By: No Limitations


General Appearance: Alert, Anxious, Mild Distress


Ears: Normal TMs


Nose: Normal Inspection


Throat/Mouth: Normal Inspection


Head: Atraumatic


Neck: Normal Inspection


Respiratory/Chest: No Respiratory Distress


Cardiovascular: Regular Rate, Rhythm, Tachycardia


GI/Abdominal: Soft, Non-Tender


 (Male) Exam: Deferred


Rectal (Males) Exam: Deferred


Back Exam: Normal Inspection


Extremities: Normal Inspection


Neurological: Alert, Oriented, Normal Cognition, Other (pt is lethargic)


Psychiatric: Normal Affect





Course





- Vital Signs


Last Recorded V/S: 


                                Last Vital Signs











Temp  36.8 C   03/10/21 10:53


 


Pulse  88   03/10/21 10:53


 


Resp  15   03/10/21 10:53


 


BP  132/70   03/10/21 10:53


 


Pulse Ox  95   03/10/21 10:53














- Orders/Labs/Meds


Orders: 


                               Active Orders 24 hr











 Category Date Time Status


 


 CULTURE BLOOD [BC] Urgent Lab  03/10/21 11:22 Received


 


 CULTURE BLOOD [BC] Urgent Lab  03/10/21 11:32 Received


 


 Sodium Chloride 0.9% [Normal Saline] 1,000 ml Med  03/10/21 11:15 Active





 IV ASDIRECTED   


 


 Sodium Chloride 0.9% [Normal Saline] 1,000 ml Med  03/10/21 11:15 Active





 IV ASDIRECTED   


 


 Blood Culture x2 Reflex Set [OM.PC] Urgent Oth  03/10/21 11:15 Ordered


 


 EKG 12 Lead [EK] Routine Ther  03/10/21 11:13 Ordered








                                Medication Orders





Sodium Chloride (Normal Saline)  1,000 mls @ 999 mls/hr IV ASDIRECTED [facility]


   Last Admin: 03/10/21 11:55  Dose: 999 mls/hr


   Documented by: PREILOR


   Infusion: 03/10/21 11:55  Dose: 999 mls/hr


   Documented by: PREILOR


   Admin: 03/10/21 11:54  Dose: 999 mls/hr


   Documented by: PREILOR


Sodium Chloride (Normal Saline)  1,000 mls @ 999 mls/hr IV ASDIRECTED [facility]


   Last Admin: 03/10/21 12:00  Dose: 999 mls/hr


   Documented by: PREILOR








Labs: 


                                Laboratory Tests











  03/10/21 03/10/21 Range/Units





  11:32 11:32 


 


POC Glucose   229 H  ()  MG/DL


 


Lactic Acid  1.6   (0.4-2.0)  mmol/L











Meds: 


Medications











Generic Name Dose Route Start Last Admin





  Trade Name Freq  PRN Reason Stop Dose Admin


 


Sodium Chloride  1,000 mls @ 999 mls/hr  03/10/21 11:15  03/10/21 11:55





  Normal Saline  IV   999 mls/hr





  ASDIRECTED [facility]   Administration


 


Sodium Chloride  1,000 mls @ 999 mls/hr  03/10/21 11:15  03/10/21 12:00





  Normal Saline  IV   999 mls/hr





  ASDIRECTED [facility]   Administration














Discontinued Medications














Generic Name Dose Route Start Last Admin





  Trade Name Pilar  PRN Reason Stop Dose Admin


 


Potassium Chloride 20 meq/  100 mls @ 50 mls/hr  03/10/21 11:13  03/10/21 11:56





  Premix  IV  03/10/21 13:12  50 mls/hr





  ONETIME ONE   Administration


 


Ceftriaxone Sodium 1 gm/  50 mls @ 100 mls/hr  03/10/21 11:22  03/10/21 11:58





  Sodium Chloride  IV  03/10/21 11:51  100 mls/hr





  ONETIME ONE   Administration














- Re-Assessments/Exams


Free Text/Narrative Re-Assessment/Exam: 





03/10/21 13:23


pt had a normal lactic acid. he does have a elevated bs. 





Departure





- Departure


Time of Disposition: 13:07


Disposition: Admitted As Inpatient 64


Condition: Fair


Clinical Impression: 


 Hypokalemia, Dehydration, UTI (urinary tract infection), CA of prostate








- Discharge Information


Referrals: 


Enrique Arrington MD [Primary Care Provider] - 


Forms:  ED Department Discharge


Care Plan Goals: 


 admit to Dr Sharp





Sepsis Event Note (ED)





- Evaluation


Sepsis Screening Result: No Definite Risk





- Focused Exam


Vital Signs: 


                                   Vital Signs











  Temp Pulse Resp BP Pulse Ox


 


 03/10/21 10:53  36.8 C  88  15  132/70  95


 


 03/10/21 10:50  36.8 C  88  15  132/70  95














- My Orders


Last 24 Hours: 


My Active Orders





03/10/21 11:13


EKG 12 Lead [EK] Routine 





03/10/21 11:15


Sodium Chloride 0.9% [Normal Saline] 1,000 ml IV ASDIRECTED 


Sodium Chloride 0.9% [Normal Saline] 1,000 ml IV ASDIRECTED 


Blood Culture x2 Reflex Set [OM.PC] Urgent 





03/10/21 11:22


CULTURE BLOOD [BC] Urgent 





03/10/21 11:32


CULTURE BLOOD [BC] Urgent 














- Assessment/Plan


Last 24 Hours: 


My Active Orders





03/10/21 11:13


EKG 12 Lead [EK] Routine 





03/10/21 11:15


Sodium Chloride 0.9% [Normal Saline] 1,000 ml IV ASDIRECTED 


Sodium Chloride 0.9% [Normal Saline] 1,000 ml IV ASDIRECTED 


Blood Culture x2 Reflex Set [OM.PC] Urgent 





03/10/21 11:22


CULTURE BLOOD [BC] Urgent 





03/10/21 11:32


CULTURE BLOOD [BC] Urgent

## 2021-03-11 NOTE — PCM.PN
- General Info


Date of Service: 03/11/21


Subjective Update: 





No acute events overnight.  No fever since admission.  Patient is feeling better

today.  Appetite is better.  Strength is better and he has been able to walk jessica

und the room.  He is passing urine more efficiently today but it is not quite 

back to normal yet.  No nausea or vomiting.  Cultures negative so far.  

Potassium improving but not quite normal yet.


Functional Status: Reports: Pain Controlled, Tolerating Diet





- Review of Systems


General: Denies: Fever


Genitourinary: Reports: Flank Pain





- Patient Data


Vitals - Most Recent: 


                                Last Vital Signs











Temp  36.1 C   03/11/21 08:00


 


Pulse  78   03/11/21 08:22


 


Resp  17   03/11/21 08:00


 


BP  137/80   03/11/21 08:22


 


Pulse Ox  98   03/11/21 08:00











Weight - Most Recent: 85.774 kg


I&O - Last 24 Hours: 


                                 Intake & Output











 03/10/21 03/11/21 03/11/21





 22:59 06:59 14:59


 


Intake Total   290


 


Output Total 675 650 250


 


Balance -675 -650 40











Lab Results Last 24 Hours: 


                         Laboratory Results - last 24 hr











  03/10/21 03/10/21 03/10/21 Range/Units





  11:32 11:32 15:26 


 


WBC     (4.5-11.0)  K/uL


 


RBC     (4.30-5.90)  M/uL


 


Hgb     (12.0-15.0)  g/dL


 


Hct     (40.0-54.0)  %


 


MCV     (80-98)  fL


 


MCH     (27-31)  pg


 


MCHC     (32-36)  %


 


Plt Count     (150-400)  K/uL


 


Neut % (Auto)     (36-66)  %


 


Lymph % (Auto)     (24-44)  %


 


Mono % (Auto)     (2-6)  %


 


Eos % (Auto)     (2-4)  %


 


Baso % (Auto)     (0-1)  %


 


Sodium     (140-148)  mmol/L


 


Potassium     (3.6-5.2)  mmol/L


 


Chloride     (100-108)  mmol/L


 


Carbon Dioxide     (21-32)  mmol/L


 


Anion Gap     (5.0-14.0)  mmol/L


 


BUN     (7-18)  mg/dL


 


Creatinine     (0.8-1.3)  mg/dL


 


Est Cr Clr Drug Dosing     mL/min


 


Estimated GFR (MDRD)     (>60)  


 


Glucose     ()  mg/dL


 


POC Glucose   229 H   ()  MG/DL


 


Lactic Acid  1.6    (0.4-2.0)  mmol/L


 


Calcium     (8.5-10.1)  mg/dL


 


Magnesium    1.0 L D  (1.8-2.4)  mg/dL














  03/10/21 03/11/21 03/11/21 Range/Units





  18:00 05:20 05:20 


 


WBC   3.4 L   (4.5-11.0)  K/uL


 


RBC   3.31 L   (4.30-5.90)  M/uL


 


Hgb   11.1 L D   (12.0-15.0)  g/dL


 


Hct   33.2 L   (40.0-54.0)  %


 


MCV   100 H   (80-98)  fL


 


MCH   34 H   (27-31)  pg


 


MCHC   33   (32-36)  %


 


Plt Count   176   (150-400)  K/uL


 


Neut % (Auto)   68 H   (36-66)  %


 


Lymph % (Auto)   15 L   (24-44)  %


 


Mono % (Auto)   13 H   (2-6)  %


 


Eos % (Auto)   4   (2-4)  %


 


Baso % (Auto)   1   (0-1)  %


 


Sodium    142  (140-148)  mmol/L


 


Potassium  2.3 L*   3.2 L  (3.6-5.2)  mmol/L


 


Chloride    104  (100-108)  mmol/L


 


Carbon Dioxide    29  (21-32)  mmol/L


 


Anion Gap    12.2  (5.0-14.0)  mmol/L


 


BUN    8  D  (7-18)  mg/dL


 


Creatinine    0.8  (0.8-1.3)  mg/dL


 


Est Cr Clr Drug Dosing    86.18  mL/min


 


Estimated GFR (MDRD)    > 60  (>60)  


 


Glucose    232 H  ()  mg/dL


 


POC Glucose     ()  MG/DL


 


Lactic Acid     (0.4-2.0)  mmol/L


 


Calcium    7.4 L D  (8.5-10.1)  mg/dL


 


Magnesium     (1.8-2.4)  mg/dL














  03/11/21 Range/Units





  05:20 


 


WBC   (4.5-11.0)  K/uL


 


RBC   (4.30-5.90)  M/uL


 


Hgb   (12.0-15.0)  g/dL


 


Hct   (40.0-54.0)  %


 


MCV   (80-98)  fL


 


MCH   (27-31)  pg


 


MCHC   (32-36)  %


 


Plt Count   (150-400)  K/uL


 


Neut % (Auto)   (36-66)  %


 


Lymph % (Auto)   (24-44)  %


 


Mono % (Auto)   (2-6)  %


 


Eos % (Auto)   (2-4)  %


 


Baso % (Auto)   (0-1)  %


 


Sodium   (140-148)  mmol/L


 


Potassium   (3.6-5.2)  mmol/L


 


Chloride   (100-108)  mmol/L


 


Carbon Dioxide   (21-32)  mmol/L


 


Anion Gap   (5.0-14.0)  mmol/L


 


BUN   (7-18)  mg/dL


 


Creatinine   (0.8-1.3)  mg/dL


 


Est Cr Clr Drug Dosing   mL/min


 


Estimated GFR (MDRD)   (>60)  


 


Glucose   ()  mg/dL


 


POC Glucose   ()  MG/DL


 


Lactic Acid   (0.4-2.0)  mmol/L


 


Calcium   (8.5-10.1)  mg/dL


 


Magnesium  2.2  (1.8-2.4)  mg/dL











Med Orders - Current: 


                               Current Medications





Acetaminophen (Acetaminophen 325 Mg Tab)  650 mg PO Q4H PRN


   PRN Reason: Pain (Mild 1-3)/fever


   Last Admin: 03/10/21 16:22 Dose:  650 mg


   Documented by: 


Enoxaparin Sodium (Enoxaparin 40 Mg/0.4 Ml Syringe)  40 mg SUBCUT Q24H [facility]


   Last Admin: 03/10/21 16:21 Dose:  40 mg


   Documented by: 


Finasteride (Finasteride 5 Mg Tab)  5 mg PO DAILY [facility]


   Last Admin: 03/11/21 08:22 Dose:  5 mg


   Documented by: 


Ceftriaxone Sodium 1 gm/ (Sodium Chloride)  50 mls @ 100 mls/hr IV Q24H [facility]


Prochlorperazine Edisylate 10 (mg/ Sodium Chloride)  52 mls @ 150 mls/hr IV Q6H 

PRN


   PRN Reason: Nausea/Vomiting


Magnesium Sulfate (Magnesium Sulfate In Water 2 Gm/50 Ml)  2 gm in 50 mls @ 12.5

mls/hr IV Q6H [facility]


   Last Admin: 03/11/21 07:25 Dose:  12.5 mls/hr


   Documented by: 


Insulin Human Lispro (Insulin Lispro 100 Unit/Ml 3 Ml Kwikpen)  0 unit SUBCUT 

QIDACANDBED [facility]; Protocol


   Last Admin: 03/11/21 08:18 Dose:  1 units


   Documented by: 


Iopamidol (Iopamidol 612 Mg/Ml 100 Ml Bottle)  100 ml IV .AS DIRECTED PRN


   PRN Reason: RADIOLOGY EXAM


   Stop: 03/11/21 15:42


   Last Admin: 03/10/21 15:33 Dose:  100 ml


   Documented by: 


Metoprolol Tartrate (Metoprolol Tartrate 50 Mg Tab)  50 mg PO Q12H [facility]


   Last Admin: 03/11/21 08:22 Dose:  50 mg


   Documented by: 


Abiraterone 250mg * (Pom*)  4 each PO DAILY@0700 [facility]


   Last Admin: 03/11/21 07:28 Dose:  4 each


   Documented by: 


Paroxetine HCl (Paroxetine 20 Mg Tab)  40 mg PO DAILY [facility]


   Last Admin: 03/11/21 08:22 Dose:  40 mg


   Documented by: 


Potassium Chloride (Potassium Chloride 20 Meq Tab.Er)  20 meq PO TIDMEALS [facility]


   Last Admin: 03/11/21 08:18 Dose:  20 meq


   Documented by: 


Prednisone (Prednisone 5 Mg Tab)  5 mg PO BIDMEALS [facility]


   Last Admin: 03/11/21 08:18 Dose:  5 mg


   Documented by: 


Sodium Chloride (Sodium Chloride 0.9% 10 Ml Syringe)  10 ml FLUSH ASDIRECTED PRN


   PRN Reason: Keep Vein Open


   Last Admin: 03/10/21 15:33 Dose:  10 ml


   Documented by: 


Tamsulosin HCl (Tamsulosin 0.4 Mg Cap.Er)  0.4 mg PO BEDTIME [facility]


   Last Admin: 03/10/21 21:42 Dose:  0.4 mg


   Documented by: 





Discontinued Medications





Finasteride (Finasteride 5 Mg Tab)  5 mg PO ONETIME ONE


   Stop: 03/10/21 17:01


   Last Admin: 03/10/21 17:03 Dose:  5 mg


   Documented by: 


Sodium Chloride (Normal Saline)  1,000 mls @ 999 mls/hr IV ASDIRECTED [facility]


   Last Admin: 03/10/21 11:55 Dose:  999 mls/hr


   Documented by: 


Sodium Chloride (Normal Saline)  1,000 mls @ 999 mls/hr IV ASDIRECTED [facility]


   Last Admin: 03/10/21 12:00 Dose:  999 mls/hr


   Documented by: 


Potassium Chloride 20 meq/ (Premix)  100 mls @ 50 mls/hr IV ONETIME ONE


   Stop: 03/10/21 13:12


   Last Admin: 03/10/21 11:56 Dose:  50 mls/hr


   Documented by: 


Ceftriaxone Sodium 1 gm/ (Sodium Chloride)  50 mls @ 100 mls/hr IV ONETIME ONE


   Stop: 03/10/21 11:51


   Last Admin: 03/10/21 11:58 Dose:  100 mls/hr


   Documented by: 


Potassium Chloride/Sodium Chloride (Normal Saline With 20 Meq Kcl)  1,000 mls [facility] 

[facility]


   Last Admin: 03/11/21 02:17 Dose:  100 mls/hr


   Documented by: 


Sodium Chloride (Normal Saline)  [facility]


   Stop: 03/10/21 15:46


   Last Admin: 03/10/21 15:34 Dose:  3 mls/sec


   Documented by: 


Potassium Chloride 20 meq/ (Premix)  100 mls @ 50 mls/hr IV ONETIME ONE


   Stop: 03/10/21 21:29


   Last Admin: 03/10/21 20:04 Dose:  50 mls/hr


   Documented by: 


Potassium Chloride 20 meq/ (Premix)  100 mls @ 50 mls/hr IV ONETIME ONE


   Stop: 03/10/21 23:29


   Last Admin: 03/10/21 22:09 Dose:  50 mls/hr


   Documented by: 


Magnesium Sulfate (Magnesium Sulfate In Water 2 Gm/50 Ml)  2 gm in 50 mls @ 12.5

mls/hr IV ONETIME ONE


   Stop: 03/10/21 23:43


   Last Admin: 03/10/21 20:05 Dose:  12.5 mls/hr


   Documented by: 


Potassium Chloride (Kcl In Water 20 Meq/100 Ml) Confirm Administered Dose 100 

mls @ as directed .ROUTE .STK-MED ONE


   Stop: 03/10/21 19:54


   Last Admin: 03/10/21 20:05 Dose:  Not Given


   Documented by: 


Lidocaine HCl (Lidocaine 1% 5 Ml Sdv)  2 ml INJECT ONETIME ONE


   Stop: 03/10/21 19:46


   Last Admin: 03/10/21 20:04 Dose:  2 ml


   Documented by: 


Ondansetron HCl (Ondansetron 4 Mg/2 Ml Sdv)  4 mg IV Q4H PRN


   PRN Reason: Nausea/Vomiting


Paroxetine HCl (Paroxetine 20 Mg Tab)  40 mg PO ONETIME ONE


   Stop: 03/10/21 17:01


   Last Admin: 03/10/21 17:01 Dose:  40 mg


   Documented by: 


Sodium Chloride (Sodium Chloride 0.9% 10 Ml Sdv)  10 ml FLUSH ONETIME ONE


   Stop: 03/10/21 15:42


   Last Admin: 03/10/21 16:22 Dose:  10 ml


   Documented by: 











- Exam


Quality Assessment: No: Supplemental Oxygen


General: Alert, Oriented, Cooperative, No Acute Distress


Lungs: Clear to Auscultation, Normal Respiratory Effort


Cardiovascular: Regular Rate, Regular Rhythm


GI/Abdominal Exam: Soft, No Distention


Extremities: No Pedal Edema.  No: Increased Warmth


Skin: Warm, Dry


Psy/Mental Status: Alert, Normal Affect





- Patient Data


Lab Results Last 24 hrs: 


                         Laboratory Results - last 24 hr











  03/10/21 03/10/21 03/10/21 Range/Units





  11:32 11:32 15:26 


 


WBC     (4.5-11.0)  K/uL


 


RBC     (4.30-5.90)  M/uL


 


Hgb     (12.0-15.0)  g/dL


 


Hct     (40.0-54.0)  %


 


MCV     (80-98)  fL


 


MCH     (27-31)  pg


 


MCHC     (32-36)  %


 


Plt Count     (150-400)  K/uL


 


Neut % (Auto)     (36-66)  %


 


Lymph % (Auto)     (24-44)  %


 


Mono % (Auto)     (2-6)  %


 


Eos % (Auto)     (2-4)  %


 


Baso % (Auto)     (0-1)  %


 


Sodium     (140-148)  mmol/L


 


Potassium     (3.6-5.2)  mmol/L


 


Chloride     (100-108)  mmol/L


 


Carbon Dioxide     (21-32)  mmol/L


 


Anion Gap     (5.0-14.0)  mmol/L


 


BUN     (7-18)  mg/dL


 


Creatinine     (0.8-1.3)  mg/dL


 


Est Cr Clr Drug Dosing     mL/min


 


Estimated GFR (MDRD)     (>60)  


 


Glucose     ()  mg/dL


 


POC Glucose   229 H   ()  MG/DL


 


Lactic Acid  1.6    (0.4-2.0)  mmol/L


 


Calcium     (8.5-10.1)  mg/dL


 


Magnesium    1.0 L D  (1.8-2.4)  mg/dL














  03/10/21 03/11/21 03/11/21 Range/Units





  18:00 05:20 05:20 


 


WBC   3.4 L   (4.5-11.0)  K/uL


 


RBC   3.31 L   (4.30-5.90)  M/uL


 


Hgb   11.1 L D   (12.0-15.0)  g/dL


 


Hct   33.2 L   (40.0-54.0)  %


 


MCV   100 H   (80-98)  fL


 


MCH   34 H   (27-31)  pg


 


MCHC   33   (32-36)  %


 


Plt Count   176   (150-400)  K/uL


 


Neut % (Auto)   68 H   (36-66)  %


 


Lymph % (Auto)   15 L   (24-44)  %


 


Mono % (Auto)   13 H   (2-6)  %


 


Eos % (Auto)   4   (2-4)  %


 


Baso % (Auto)   1   (0-1)  %


 


Sodium    142  (140-148)  mmol/L


 


Potassium  2.3 L*   3.2 L  (3.6-5.2)  mmol/L


 


Chloride    104  (100-108)  mmol/L


 


Carbon Dioxide    29  (21-32)  mmol/L


 


Anion Gap    12.2  (5.0-14.0)  mmol/L


 


BUN    8  D  (7-18)  mg/dL


 


Creatinine    0.8  (0.8-1.3)  mg/dL


 


Est Cr Clr Drug Dosing    86.18  mL/min


 


Estimated GFR (MDRD)    > 60  (>60)  


 


Glucose    232 H  ()  mg/dL


 


POC Glucose     ()  MG/DL


 


Lactic Acid     (0.4-2.0)  mmol/L


 


Calcium    7.4 L D  (8.5-10.1)  mg/dL


 


Magnesium     (1.8-2.4)  mg/dL














  03/11/21 Range/Units





  05:20 


 


WBC   (4.5-11.0)  K/uL


 


RBC   (4.30-5.90)  M/uL


 


Hgb   (12.0-15.0)  g/dL


 


Hct   (40.0-54.0)  %


 


MCV   (80-98)  fL


 


MCH   (27-31)  pg


 


MCHC   (32-36)  %


 


Plt Count   (150-400)  K/uL


 


Neut % (Auto)   (36-66)  %


 


Lymph % (Auto)   (24-44)  %


 


Mono % (Auto)   (2-6)  %


 


Eos % (Auto)   (2-4)  %


 


Baso % (Auto)   (0-1)  %


 


Sodium   (140-148)  mmol/L


 


Potassium   (3.6-5.2)  mmol/L


 


Chloride   (100-108)  mmol/L


 


Carbon Dioxide   (21-32)  mmol/L


 


Anion Gap   (5.0-14.0)  mmol/L


 


BUN   (7-18)  mg/dL


 


Creatinine   (0.8-1.3)  mg/dL


 


Est Cr Clr Drug Dosing   mL/min


 


Estimated GFR (MDRD)   (>60)  


 


Glucose   ()  mg/dL


 


POC Glucose   ()  MG/DL


 


Lactic Acid   (0.4-2.0)  mmol/L


 


Calcium   (8.5-10.1)  mg/dL


 


Magnesium  2.2  (1.8-2.4)  mg/dL











Result Diagrams: 


                                 03/11/21 05:20





                                 03/11/21 05:20





Sepsis Event Note





- Evaluation


Sepsis Screening Result: No Definite Risk





- Focused Exam


Vital Signs: 


                                   Vital Signs











  Temp Pulse Pulse Resp BP BP Pulse Ox


 


 03/11/21 08:22   78    137/80  


 


 03/11/21 08:00  36.1 C   87  17   137/80  98


 


 03/11/21 04:00    70  18   121/74  96


 


 03/11/21 00:00    64  17   123/72  93 L














- Problem List Review


Problem List Initiated/Reviewed/Updated: Yes





- My Orders


Last 24 Hours: 


My Active Orders





03/11/21 10:27


Discontinue Telemetry Monitoring [Cardiac Monitoring Discontinue] [RC] Click to 

Edit 


Up ad Keila [RC] ASDIRECTED 





03/11/21 10:30


NS + KCl 20mEq/L [Normal Saline with 20 mEq KCl] 1,000 ml IV ASDIRECTED 





03/12/21 05:00


BASIC METABOLIC PANEL,BMP [CHEM] Timed 


CBC W/O DIFF,HEMOGRAM [HEME] Timed (1) 














- Plan


Plan:: 





ASSESSMENT AND PLAN - 





Probable bilateral pyelonephritis-complicated urinary tract infection in the 

setting of prostate cancer.  Cultures negative so far.  Clinically improving.  

CT scan did not show any evidence for abscess but did show some stranding around

 the kidneys.


-Continue ceftriaxone


Follow-up urine culture from clinic and blood cultures from the hospital.


Gentle IV fluids


-Pain control





HYPOKALEMIA-improving with supplementation but still low.


-Continue gentle fluids with potassium as well as 3 times daily oral 

supplementation





DIABETES MELLITUS-sugars fairly well controlled so far.


Hold PTA Metformin and glyburide today, anticipate restarting tomorrow


Requested 4 times daily Accu-Cheks with low-dose sliding scale insulin during 

patient's hospitalization





HYPERTENSION-blood pressure well controlled so far.


Hold home meds until blood pressure rises a little further





PROSTATE CANCER-Patient usually on abiraterone.


Resume abiraterone following hospitalization and follow-up routine with 

urology/oncology.





DVT PROPHYLAXIS


Enoxaparin





DISPOSITION-anticipate discharge home after the hospital stay, possibly as early

 as tomorrow if stable overnight





Fer Stewart MD

## 2021-03-12 NOTE — [id number]
**Discharge Summary





- Hospital Course


Brief History: 70-year-old male with a history of prostate cancer, type 2 

diabetes mellitus who presented with fatigue, anorexia and difficulty passing 

urine.  He was admitted for management of a complicated urinary tract infection 

as well as profound hypokalemia and hypomagnesemia.


Diagnosis: Stroke: No





- Discharge Data


Discharge Date: 03/12/21


Discharge Disposition: Home, Self-Care 01


Condition: Good





- Referral to Home Health


Primary Care Physician: 


Enrique Arrington MD








- Discharge Diagnosis/Problem(s)


(1) Complicated urinary tract infection


SNOMED Code(s): 03360916


   ICD Code: N39.0 - URINARY TRACT INFECTION, SITE NOT SPECIFIED   Status: Acute

  Current Visit: Yes   





(2) Hypokalemia


SNOMED Code(s): 11181683


   ICD Code: E87.6 - HYPOKALEMIA   Status: Acute   Current Visit: Yes   





(3) CA of prostate


SNOMED Code(s): 500491639


   ICD Code: C61 - MALIGNANT NEOPLASM OF PROSTATE   Status: Chronic   Current 

Visit: Yes   





(4) Type 2 diabetes mellitus


SNOMED Code(s): 00205238


   ICD Code: E11.9 - TYPE 2 DIABETES MELLITUS WITHOUT COMPLICATIONS   Status: 

Chronic   Current Visit: No   


Qualifiers: 


   Diabetes mellitus long term insulin use: without long term use   Diabetes 

mellitus complication status: without complication   Qualified Code(s): E11.9 - 

Type 2 diabetes mellitus without complications   





- Patient Summary/Data


Consults: 


                                  Consultations





03/12/21 11:30


Consult to Physical Therapy [PT Evaluation and Treatment] [CONS] Routine 


   Please Evaluate and Treat.


   PT Reason for Consult: muscle spasm Right Shoulder


   This query below is only for informational purposes and is not editable.


   Admission Diagnosis/Problem: Complicated urinary tract infection











Hospital Course: 





Hira presented to the emergency room with weakness, fatigue anorexia and 

difficulty passing urine.  Work-up in the emergency room was suggestive of a 

complicated urinary tract infection.  Also noted was profound hypokalemia and 

hypomagnesemia.  The patient did receive some supplementation in the emergency 

room as well as IV antibiotics.  He was admitted to the hospital for further 

management.  A CT scan of the abdomen and pelvis was obtained shortly after 

admission to rule out intra-abdominal pathology.  This did show some mild 

stranding around both kidneys but no evidence for abscess and no impressive 

pyelonephritis though this could represent early pyelonephritis.  His bladder 

wall was also noted to be thickened consistent with a bladder infection.  

Throughout the first 24 hours his potassium and magnesium were aggressively 

supplemented with a steady improvement in both levels.  With IV fluids and 

antibiotics his nausea, fatigue and anorexia improved.  His strength improved 

quite rapidly during the course of the hospital stay as well.  His magnesium and

 potassium levels have normalized with supplementation.  He was febrile at the 

time of presentation but has been afebrile since that time.  His urine culture 

and his blood cultures have not grown out any specific bacteria.  He has been 

improving with the ceftriaxone so the plan is to transition him to cefdinir at 

the time of discharge home.  He is stable and safe for discharge.  During the 

hospital stay he did report some neck and shoulder pain.  This was felt to be a 

muscle spasm with no preceding injury.  It has responded well to muscle relaxers

 and massage.  He will be getting a prescription for a short course of muscle 

relaxers.  He has early follow-up scheduled.





- Patient Instructions


Diet: Diabetic Diet


Activity: As Tolerated


Driving: Do Not Drive (if you are taking the muscle relaxers)


Showering/Bathing: May Shower


Notify Provider of: Fever, Increased Pain


Other/Special Instructions: 1. You were in the in the hospital for management of

 a complicated urinary tract infection and possibly pyelonephritis (kidney 

infection).  Your condition has been improving with IV fluids as well as 

antibiotic therapy.  We did not determine a causative bacteria from either the 

urine culture or the blood cultures.  Your condition has been improving with the

 empiric antibiotic therapy so we will transition to an oral form of this 

medication.  I recommend that you take cefdinir (Omnicef) 300 mg twice daily for

 9 more doses.  Your first dose outside of the hospital will be due tonight.  

You should take this medication with food to avoid stomach upset.  2. Regarding 

your neck pain, I suspect that you have muscle spasm or muscle strain involving 

the right shoulder and neck area.  You may use ibuprofen or acetaminophen to 

help with pain.  I would recommend that you alternate ice and heat every 2 

hours.  You can apply either the ice or the heat for about 15 to 20 minutes at a

 time.  I did provide a small prescription for tizanidine.  This is a muscle 

relaxer that can also help to reduce the muscle spasm.  This medication can make

 you sleepy so you should not drive or plan to be active after you have taken 

the medication.  3. Continue your other home medications as previously 

prescribed.  4. Follow up with your primary care as scheduled or sooner if your 

condition does not continue to improve or worsens





- Discharge Plan


*PRESCRIPTION DRUG MONITORING PROGRAM REVIEWED*: Not Applicable


*COPY OF PRESCRIPTION DRUG MONITORING REPORT IN PATIENT ELDER: Not Applicable


Prescriptions/Med Rec: 


Cefdinir 300 mg PO BID #9 capsule


tiZANidine HCl [Zanaflex] 2 mg PO Q8H PRN #10 capsule


 PRN Reason: Muscle Spasm


Home Medications: 


                                    Home Meds





Losartan/Hydrochlorothiazide [Hyzaar 100-12.5 Tablet] 2 each PO DAILY 10/04/13 

[History]


Metoprolol Tartrate [Lopressor] 100 mg PO BID 10/04/13 [History]


PARoxetine HCl [Paxil] 40 mg PO DAILY 10/04/13 [History]


hydrALAZINE HCl [Hydralazine HCl] 25 mg PO BID 10/04/13 [History]


metFORMIN [Glucophage] 1,000 mg PO BID 10/04/13 [History]


Chlorpheniramine Maleate 12 mg PO DAILY 12/17/20 [History]


Cholecalciferol (Vitamin D3) [Vitamin D3] 1,000 unit PO DAILY 12/17/20 [History]


Copper/Ginseng/Saw Palm/Zinc [Prostate Health Formula] 1 each PO DAILY 12/17/20 

[History]


Cyanocobalamin (Vitamin B-12) [B-12] 2,500 mcg SL DAILY 12/17/20 [History]


Folic Acid 0.4 mg PO DAILY 12/17/20 [History]


Furosemide [Lasix] 20 mg PO DAILY 12/17/20 [History]


Multivitamin [Multi-Vitamin Daily] 1 each PO DAILY 12/17/20 [History]


Ondansetron [Ondansetron ODT] 4 mg PO Q8HR PRN 12/17/20 [History]


Abiraterone Acetate 1,000 mg PO DAILY 03/10/21 [History]


Finasteride 5 mg PO DAILY 03/10/21 [History]


Tamsulosin [Flomax] 0.4 mg PO DAILY 03/10/21 [History]


predniSONE 5 mg PO BID 03/10/21 [History]


Cefdinir 300 mg PO BID #9 capsule 03/12/21 [Rx]


glipiZIDE [Glucotrol] 5 mg PO DAILY 03/12/21 [History]


tiZANidine HCl [Zanaflex] 2 mg PO Q8H PRN #10 capsule 03/12/21 [Rx]








Patient Handouts:  Urinary Tract Infection, Adult, Tizanidine tablets or 

capsules


Referrals: 


Enrique Arrington MD [Primary Care Provider] - 03/19/21 1:40 pm (Please arrive 5

minutes early to register for your appointment.)





- Discharge Summary/Plan Comment


DC Time >30 min.: No





- Patient Data


Vitals - Most Recent: 


                                Last Vital Signs











Temp  36.2 C   03/12/21 15:00


 


Pulse  73   03/12/21 15:00


 


Resp  18   03/12/21 15:00


 


BP  116/69   03/12/21 15:00


 


Pulse Ox  95   03/12/21 15:00











Weight - Most Recent: 85.774 kg


I&O - Last 24 hours: 


                                 Intake & Output











 03/12/21 03/12/21 03/12/21





 06:59 14:59 22:59


 


Intake Total 1283  


 


Output Total 1000  


 


Balance 283  











Lab Results - Last 24 hrs: 


                         Laboratory Results - last 24 hr











  03/12/21 03/12/21 03/12/21 Range/Units





  05:20 05:20 07:30 


 


WBC  5.5    (4.5-11.0)  K/uL


 


RBC  3.26 L    (4.30-5.90)  M/uL


 


Hgb  10.7 L    (12.0-15.0)  g/dL


 


Hct  33.1 L    (40.0-54.0)  %


 


MCV  102 H    (80-98)  fL


 


MCH  33 H    (27-31)  pg


 


MCHC  32    (32-36)  %


 


Plt Count  191    (150-400)  K/uL


 


Sodium   138 L   (140-148)  mmol/L


 


Potassium   3.6   (3.6-5.2)  mmol/L


 


Chloride   102   (100-108)  mmol/L


 


Carbon Dioxide   27   (21-32)  mmol/L


 


Anion Gap   12.6   (5.0-14.0)  mmol/L


 


BUN   6 L   (7-18)  mg/dL


 


Creatinine   0.8   (0.8-1.3)  mg/dL


 


Est Cr Clr Drug Dosing   86.39   mL/min


 


Estimated GFR (MDRD)   > 60   (>60)  


 


Glucose   203 H   ()  mg/dL


 


POC Glucose    198 H  ()  MG/DL


 


Calcium   7.3 L   (8.5-10.1)  mg/dL














  03/12/21 Range/Units





  11:25 


 


WBC   (4.5-11.0)  K/uL


 


RBC   (4.30-5.90)  M/uL


 


Hgb   (12.0-15.0)  g/dL


 


Hct   (40.0-54.0)  %


 


MCV   (80-98)  fL


 


MCH   (27-31)  pg


 


MCHC   (32-36)  %


 


Plt Count   (150-400)  K/uL


 


Sodium   (140-148)  mmol/L


 


Potassium   (3.6-5.2)  mmol/L


 


Chloride   (100-108)  mmol/L


 


Carbon Dioxide   (21-32)  mmol/L


 


Anion Gap   (5.0-14.0)  mmol/L


 


BUN   (7-18)  mg/dL


 


Creatinine   (0.8-1.3)  mg/dL


 


Est Cr Clr Drug Dosing   mL/min


 


Estimated GFR (MDRD)   (>60)  


 


Glucose   ()  mg/dL


 


POC Glucose  216 H  ()  MG/DL


 


Calcium   (8.5-10.1)  mg/dL











[place] Results - Last 24 hrs: 


                                  Microbiology











 03/10/21 11:22 Aerobic Blood Culture - Preliminary





 Blood - Venous    NO GROWTH AFTER 2 DAYS





 Anaerobic Blood Culture - Preliminary





    NO GROWTH AFTER 2 DAYS


 


 03/10/21 11:32 Aerobic Blood Culture - Preliminary





 Blood - Venous - Lab Draw    NO GROWTH AFTER 2 DAYS





 Anaerobic Blood Culture - Preliminary





    NO GROWTH AFTER 2 DAYS











Med Orders - Current: 


                               Current Medications





Acetaminophen (Acetaminophen 325 Mg Tab)  650 mg PO Q4H PRN


   PRN Reason: Pain (Mild 1-3)/fever


   Last Admin: 03/12/21 07:24 Dose:  650 mg


   Documented by: 


Enoxaparin Sodium (Enoxaparin 40 Mg/0.4 Ml Syringe)  40 mg SUBCUT Q24H [facility]


   Last Admin: 03/11/21 15:09 Dose:  40 mg


   Documented by: 


Finasteride (Finasteride 5 Mg Tab)  5 mg PO DAILY [facility]


   Last Admin: 03/12/21 08:35 Dose:  5 mg


   Documented by: 


Glyburide (Glyburide 2.5 Mg Tab)  2.5 mg PO DAILY [facility]


   Last Admin: 03/12/21 08:34 Dose:  2.5 mg


   Documented by: 


Ceftriaxone Sodium 1 gm/ (Sodium Chloride)  50 mls @ 100 mls/hr IV Q24H [facility]


   Last Admin: 03/12/21 11:00 Dose:  100 mls/hr


   Documented by: 


Prochlorperazine Edisylate 10 (mg/ Sodium Chloride)  52 mls @ 150 mls/hr IV Q6H 

PRN


   PRN Reason: Nausea/Vomiting


Insulin Human Lispro (Insulin Lispro 100 Unit/Ml 3 Ml Kwikpen)  0 unit SUBCUT 

QIDACANDBED [facility]; Protocol


   Last Admin: 03/12/21 11:37 Dose:  2 units


   Documented by: 


Metformin HCl (Metformin 500 Mg Tab)  1,000 mg PO BIDAC [facility]


   Last Admin: 03/12/21 08:34 Dose:  1,000 mg


   Documented by: 


Metoprolol Tartrate (Metoprolol Tartrate 50 Mg Tab)  50 mg PO Q12H [facility]


   Last Admin: 03/12/21 08:34 Dose:  50 mg


   Documented by: 


Abiraterone 250mg * (Pom*)  4 each PO DAILY@0700 [facility]


   Last Admin: 03/12/21 07:24 Dose:  4 each


   Documented by: 


Paroxetine HCl (Paroxetine 20 Mg Tab)  40 mg PO DAILY [facility]


   Last Admin: 03/12/21 08:34 Dose:  40 mg


   Documented by: 


Potassium Chloride (Potassium Chloride 20 Meq Tab.Er)  20 meq PO TIDMEALS [facility]


   Last Admin: 03/12/21 11:00 Dose:  20 meq


   Documented by: 


Prednisone (Prednisone 5 Mg Tab)  5 mg PO BIDMEALS [facility]


   Last Admin: 03/12/21 07:27 Dose:  5 mg


   Documented by: 


Sodium Chloride (Sodium Chloride 0.9% 10 Ml Syringe)  10 ml FLUSH ASDIRECTED PRN


   PRN Reason: Keep Vein Open


   Last Admin: 03/10/21 15:33 Dose:  10 ml


   Documented by: 


Tamsulosin HCl (Tamsulosin 0.4 Mg Cap.Er)  0.4 mg PO BEDTIME [facility]


   Last Admin: 03/11/21 21:20 Dose:  0.4 mg


   Documented by: 


Tizanidine HCl (Tizanidine 2 Mg Tab)  2 mg PO Q6H PRN


   PRN Reason: Muscle Spasm


   Last Admin: 03/12/21 11:46 Dose:  2 mg


   Documented by: 





Discontinued Medications





Finasteride (Finasteride 5 Mg Tab)  5 mg PO ONETIME ONE


   Stop: 03/10/21 17:01


   Last Admin: 03/10/21 17:03 Dose:  5 mg


   Documented by: 


Sodium Chloride (Normal Saline)  1,000 mls @ 999 mls/hr IV ASDIRECTED [facility]


   Last Admin: 03/10/21 11:55 Dose:  999 mls/hr


   Documented by: 


Sodium Chloride (Normal Saline)  1,000 mls @ 999 mls/hr IV ASDIRECTED [facility]


   Last Admin: 03/10/21 12:00 Dose:  999 mls/hr


   Documented by: 


Potassium Chloride 20 meq/ (Premix)  100 mls @ 50 mls/hr IV ONETIME ONE


   Stop: 03/10/21 13:12


   Last Admin: 03/10/21 11:56 Dose:  50 mls/hr


   Documented by: 


Ceftriaxone Sodium 1 gm/ (Sodium Chloride)  50 mls @ 100 mls/hr IV ONETIME ONE


   Stop: 03/10/21 11:51


   Last Admin: 03/10/21 11:58 Dose:  100 mls/hr


   Documented by: 


Potassium Chloride/Sodium Chloride (Normal Saline With 20 Meq Kcl)  1,000 mls @ 

100 mls/hr IV ASDIRECTED [facility]


   Last Admin: 03/11/21 02:17 Dose:  100 mls/hr


   Documented by: 


Sodium Chloride (Normal Saline)  100 mls @ 3 mls/sec IV ASDIRECTED [facility]


   Stop: 03/10/21 15:46


   Last Admin: 03/10/21 15:34 Dose:  3 mls/sec


   Documented by: 


Potassium Chloride 20 meq/ (Premix)  100 mls @ 50 mls/hr IV ONETIME ONE


   Stop: 03/10/21 21:29


   Last Admin: 03/10/21 20:04 Dose:  50 mls/hr


   Documented by: 


Potassium Chloride 20 meq/ (Premix)  100 mls @ 50 mls/hr IV ONETIME ONE


   Stop: 03/10/21 23:29


   Last Admin: 03/10/21 22:09 Dose:  50 mls/hr


   Documented by: 


Magnesium Sulfate (Magnesium Sulfate In Water 2 Gm/50 Ml)  2 gm in 50 mls @ 12.5

mls/hr IV ONETIME ONE


   Stop: 03/10/21 23:43


   Last Admin: 03/10/21 20:05 Dose:  12.5 mls/hr


   Documented by: 


Magnesium Sulfate (Magnesium Sulfate In Water 2 Gm/50 Ml)  2 gm in 50 mls @ 12.5

mls/hr IV Q6H [facility]


   Last Admin: 03/11/21 07:25 Dose:  12.5 mls/hr


   Documented by: 


Potassium Chloride (Kcl In Water 20 Meq/100 Ml) Confirm Administered Dose 100 

mls @ as directed .ROUTE .STK-MED ONE


   Stop: 03/10/21 19:54


   Last Admin: 03/10/21 20:05 Dose:  Not Given


   Documented by: 


Potassium Chloride/Sodium Chloride (Normal Saline With 20 Meq Kcl)  1,000 mls @ 

50 mls/hr IV ASDIRECTED [facility]


   Last Admin: 03/11/21 12:34 Dose:  50 mls/hr


   Documented by: 


Iopamidol (Iopamidol 612 Mg/Ml 100 Ml Bottle)  100 ml IV .AS DIRECTED PRN


   PRN Reason: RADIOLOGY EXAM


   Stop: 03/11/21 15:42


   Last Admin: 03/10/21 15:33 Dose:  100 ml


   Documented by: 


Lidocaine HCl (Lidocaine 1% 5 Ml Sdv)  2 ml INJECT ONETIME ONE


   Stop: 03/10/21 19:46


   Last Admin: 03/10/21 20:04 Dose:  2 ml


   Documented by: 


Ondansetron HCl (Ondansetron 4 Mg/2 Ml Sdv)  4 mg IV Q4H PRN


   PRN Reason: Nausea/Vomiting


Paroxetine HCl (Paroxetine 20 Mg Tab)  40 mg PO ONETIME ONE


   Stop: 03/10/21 17:01


   Last Admin: 03/10/21 17:01 Dose:  40 mg


   Documented by: 


Sodium Chloride (Sodium Chloride 0.9% 10 Ml Sdv)  10 ml FLUSH ONETIME ONE


   Stop: 03/10/21 15:42


   Last Admin: 03/10/21 16:22 Dose:  10 ml


   Documented by: